# Patient Record
Sex: FEMALE | Race: WHITE | Employment: OTHER | URBAN - NONMETROPOLITAN AREA
[De-identification: names, ages, dates, MRNs, and addresses within clinical notes are randomized per-mention and may not be internally consistent; named-entity substitution may affect disease eponyms.]

---

## 2019-05-20 ENCOUNTER — OFFICE VISIT (OUTPATIENT)
Dept: FAMILY MEDICINE | Facility: CLINIC | Age: 64
End: 2019-05-20
Payer: COMMERCIAL

## 2019-05-20 VITALS
SYSTOLIC BLOOD PRESSURE: 138 MMHG | RESPIRATION RATE: 24 BRPM | DIASTOLIC BLOOD PRESSURE: 80 MMHG | BODY MASS INDEX: 57.37 KG/M2 | WEIGHT: 292.2 LBS | OXYGEN SATURATION: 97 % | TEMPERATURE: 98.6 F | HEIGHT: 60 IN

## 2019-05-20 DIAGNOSIS — J20.9 ACUTE BRONCHITIS WITH SYMPTOMS > 10 DAYS: Primary | ICD-10-CM

## 2019-05-20 PROCEDURE — 99213 OFFICE O/P EST LOW 20 MIN: CPT | Performed by: NURSE PRACTITIONER

## 2019-05-20 RX ORDER — BENZONATATE 200 MG/1
100-200 CAPSULE ORAL 3 TIMES DAILY PRN
Qty: 42 CAPSULE | Refills: 0 | Status: SHIPPED | OUTPATIENT
Start: 2019-05-20 | End: 2019-05-23

## 2019-05-20 RX ORDER — PREDNISONE 20 MG/1
20 TABLET ORAL DAILY
Qty: 5 TABLET | Refills: 0 | Status: SHIPPED | OUTPATIENT
Start: 2019-05-20 | End: 2019-05-23

## 2019-05-20 RX ORDER — AZITHROMYCIN 250 MG/1
TABLET, FILM COATED ORAL
Qty: 6 TABLET | Refills: 0 | Status: SHIPPED | OUTPATIENT
Start: 2019-05-20 | End: 2019-05-23

## 2019-05-20 ASSESSMENT — PAIN SCALES - GENERAL: PAINLEVEL: NO PAIN (0)

## 2019-05-20 ASSESSMENT — MIFFLIN-ST. JEOR: SCORE: 1803.78

## 2019-05-20 NOTE — PROGRESS NOTES
Subjective     Albina López is a 63 year old female who presents to clinic today for the following health issues:    HPI   RESPIRATORY SYMPTOMS      Duration: 2 weeks    Description  nasal congestion, rhinorrhea, cough, wheezing, chills, ear pain and pressure on right, headache, fatigue/malaise, hoarse voice, myalgias and diarrhea    Severity: moderate    Accompanying signs and symptoms: None    History (predisposing factors):  none    Precipitating or alleviating factors: vitamin C, zinc, tylenol for hip pain, robitussin DM    Therapies tried and outcome:  Acetaminophen,  Guaifenesin loosened things up. Made her cough.       No fevers   Shortness of breath with walking - newer this last week   No recent illness exposures       Patient Active Problem List   Diagnosis     Advanced directives, counseling/discussion     CARDIOVASCULAR SCREENING; LDL GOAL LESS THAN 130     Calcium nephrolithiasis     Obesity, morbid (more than 100 lbs over ideal weight or BMI > 40) (H)     Microscopic hematuria     Health Care Home     Past Surgical History:   Procedure Laterality Date     APPENDECTOMY       CHOLECYSTECTOMY       CYSTOSCOPY, RETROGRADES, INSERT STENT URETER(S), COMBINED  4/19/2012    Procedure:COMBINED CYSTOSCOPY, RETROGRADES, INSERT STENT URETER(S); Cystoscopy with Right ureteral stent placement; Surgeon:MARII ESCOBEDO; Location: OR     DILATION AND CURETTAGE, OPERATIVE HYSTEROSCOPY, COMBINED N/A 11/27/2015    Procedure: COMBINED DILATION AND CURETTAGE, OPERATIVE HYSTEROSCOPY;  Surgeon: Rufina Santizo MD;  Location: [facility] OR     ENT SURGERY       LASER HOLMIUM LITHOTRIPSY URETER(S), INSERT STENT, COMBINED  5/4/2012    Procedure:COMBINED CYSTOSCOPY, URETEROSCOPY, LASER HOLMIUM LITHOTRIPSY URETER(S), INSERT STENT; Right ureteral stone extraction,holmium laser, stent placement; Surgeon:JESSA AREVALO; Location:[facility] OR     TONSILLECTOMY         Social History     Tobacco Use     Smoking status: Former  "Smoker     Packs/day: 1.00     Years: 1.00     Pack years: 1.00     Smokeless tobacco: Never Used   Substance Use Topics     Alcohol use: No     Family History   Problem Relation Age of Onset     Heart Disease Father         chf     Obesity Sister      Hypertension Daughter      Diabetes Maternal Grandmother      Cancer Maternal Grandmother         kidney     Diabetes Maternal Grandfather      ROULA Maternal Grandfather          Current Outpatient Medications   Medication Sig Dispense Refill     azithromycin (ZITHROMAX) 250 MG tablet Take 2 tablets (500 mg) by mouth daily for 1 day, THEN 1 tablet (250 mg) daily for 4 days. 6 tablet 0     benzonatate (TESSALON) 200 MG capsule Take 0.5-1 capsules (100-200 mg) by mouth 3 times daily as needed for cough 42 capsule 0     predniSONE (DELTASONE) 20 MG tablet Take 20 mg by mouth daily for 5 days. 5 tablet 0     aspirin 81 MG tablet Take 81 mg by mouth daily.       Allergies   Allergen Reactions     Ciprofloxacin      Dilaudid [Hydromorphone] Other (See Comments)     Decrease in BP     Fluconazole Swelling     Lips swelled and throat closed     Morphine Sulfate Other (See Comments)     Chest heaviness     Nystatin Rash     And burning       Reviewed and updated as needed this visit by Provider  Tobacco  Allergies  Meds  Problems  Med Hx  Surg Hx  Fam Hx  Soc Hx          Review of Systems   ROS COMP: Constitutional, HEENT, cardiovascular, pulmonary, gi and gu systems are negative, except as otherwise noted.      Objective    /80 (BP Location: Right arm, Patient Position: Sitting, Cuff Size: Adult Regular)   Temp 98.6  F (37  C) (Tympanic)   Resp 24   Ht 1.535 m (5' 0.43\")   Wt 132.5 kg (292 lb 3.2 oz)   LMP 09/29/2004   SpO2 97%   BMI 56.25 kg/m    Body mass index is 56.25 kg/m .  Physical Exam   GENERAL APPEARANCE: healthy, alert and no distress  EYES: Eyes grossly normal to inspection, PERRL and conjunctivae and sclerae normal  HENT: ear canals and TM's " "normal and nose and mouth without ulcers or lesions  NECK: no adenopathy, no asymmetry, masses, or scars and thyroid normal to palpation  RESP: lungs clear to auscultation - no rales, rhonchi or wheezes and coarse cough present  CV: regular rates and rhythm, normal S1 S2, no S3 or S4 and no murmur, click or rub  ABDOMEN: soft, nontender, without hepatosplenomegaly or masses and bowel sounds normal  MS: extremities normal- no gross deformities noted    Diagnostic Test Results:  Labs reviewed in Epic        Assessment & Plan     1. Acute bronchitis with symptoms > 10 days  Patient with 2-week history of increased congestion, coarse, productive cough.  Intermittent wheezing.  Denies fevers, however does note some prednisone as well as Tessalon for cough.  Patient advised supportive cares as well and when to return to clinic if symptoms not improving.  - azithromycin (ZITHROMAX) 250 MG tablet; Take 2 tablets (500 mg) by mouth daily for 1 day, THEN 1 tablet (250 mg) daily for 4 days.  Dispense: 6 tablet; Refill: 0  - benzonatate (TESSALON) 200 MG capsule; Take 0.5-1 capsules (100-200 mg) by mouth 3 times daily as needed for cough  Dispense: 42 capsule; Refill: 0  - predniSONE (DELTASONE) 20 MG tablet; Take 20 mg by mouth daily for 5 days.  Dispense: 5 tablet; Refill: 0     BMI:   Estimated body mass index is 56.25 kg/m  as calculated from the following:    Height as of this encounter: 1.535 m (5' 0.43\").    Weight as of this encounter: 132.5 kg (292 lb 3.2 oz).   Weight management plan: Discussed healthy diet and exercise guidelines        Patient Instructions     Start Zithromax for 5 days    5 days of Prednisone     Tessalon for cough 1-2 capsules up to three times daily     Keep head of bed elevated at night and use cool mist vaporizer       Your clinic record indicates that you are due for:   Mammogram, Pap and physical exam and Colonoscopy or yearly stool blood testing (FIT)  Please schedule a physical " exam.      Patient Education     Acute Bronchitis  Your healthcare provider has told you that you have acute bronchitis. Bronchitis is infection or inflammation of the bronchial tubes (airways in the lungs). Normally, air moves easily in and out of the airways. Bronchitis narrows the airways, making it harder for air to flow in and out of the lungs. This causes symptoms such as shortness of breath, coughing up yellow or green mucus, and wheezing. Bronchitis can be acute or chronic. Acute means the condition comes on quickly and goes away in a short time, usually within 3 to 10 days. Chronic means a condition lasts a long time and often comes back.    What causes acute bronchitis?  Acute bronchitis almost always starts as a viral respiratory infection, such as a cold or the flu. Certain factors make it more likely for a cold or flu to turn into bronchitis. These include being very young, being elderly, having a heart or lung problem, or having a weak immune system. Cigarette smoking also makes bronchitis more likely.  When bronchitis develops, the airways become swollen. The airways may also become infected with bacteria. This is known as a secondary infection.  Diagnosing acute bronchitis  Your healthcare provider will examine you and ask about your symptoms and health history. You may also have a sputum culture to test the fluid in your lungs. Chest X-rays may be done to look for infection in the lungs.  Treating acute bronchitis  Bronchitis usually clears up as the cold or flu goes away. You can help feel better faster by doing the following:    Take medicine as directed. You may be told to take ibuprofen or other over-the-counter medicines. These help relieve inflammation in your bronchial tubes. Your healthcare provider may prescribe an inhaler to help open up the bronchial tubes. Most of the time, acute bronchitis is caused by a viral infection. Antibiotics are usually not prescribed for viral  infections.    Drink plenty of fluids, such as water, juice, or warm soup. Fluids loosen mucus so that you can cough it up. This helps you breathe more easily. Fluids also prevent dehydration.    Make sure you get plenty of rest.    Do not smoke. Do not allow anyone else to smoke in your home.  Recovery and follow-up  Follow up with your doctor as you are told. You will likely feel better in a week or two. But a dry cough can linger beyond that time. Let your doctor know if you still have symptoms (other than a dry cough) after 2 weeks, or if you re prone to getting bronchial infections. Take steps to protect yourself from future infections. These steps include stopping smoking and avoiding tobacco smoke, washing your hands often, and getting a yearly flu shot.  When to call your healthcare provider  Call the healthcare provider if you have any of the following:    Fever of 100.4 F (38.0 C) or higher, or as advised    Symptoms that get worse, or new symptoms    Trouble breathing    Symptoms that don t start to improve within a week, or within 3 days of taking antibiotics   Date Last Reviewed: 12/1/2016 2000-2018 [employer]. [address] [place] [address], [place] [address]. All rights reserved. This information is not intended as a substitute for professional medical care. Always follow your healthcare professional's instructions.               Return in about 1 week (around 5/27/2019), or if symptoms worsen or fail to improve.    FABRICE Clayton CNP  [facility]

## 2019-05-20 NOTE — PATIENT INSTRUCTIONS
Start Zithromax for 5 days    5 days of Prednisone     Tessalon for cough 1-2 capsules up to three times daily     Keep head of bed elevated at night and use cool mist vaporizer       Your clinic record indicates that you are due for:   Mammogram, Pap and physical exam and Colonoscopy or yearly stool blood testing (FIT)  Please schedule a physical exam.      Patient Education     Acute Bronchitis  Your healthcare provider has told you that you have acute bronchitis. Bronchitis is infection or inflammation of the bronchial tubes (airways in the lungs). Normally, air moves easily in and out of the airways. Bronchitis narrows the airways, making it harder for air to flow in and out of the lungs. This causes symptoms such as shortness of breath, coughing up yellow or green mucus, and wheezing. Bronchitis can be acute or chronic. Acute means the condition comes on quickly and goes away in a short time, usually within 3 to 10 days. Chronic means a condition lasts a long time and often comes back.    What causes acute bronchitis?  Acute bronchitis almost always starts as a viral respiratory infection, such as a cold or the flu. Certain factors make it more likely for a cold or flu to turn into bronchitis. These include being very young, being elderly, having a heart or lung problem, or having a weak immune system. Cigarette smoking also makes bronchitis more likely.  When bronchitis develops, the airways become swollen. The airways may also become infected with bacteria. This is known as a secondary infection.  Diagnosing acute bronchitis  Your healthcare provider will examine you and ask about your symptoms and health history. You may also have a sputum culture to test the fluid in your lungs. Chest X-rays may be done to look for infection in the lungs.  Treating acute bronchitis  Bronchitis usually clears up as the cold or flu goes away. You can help feel better faster by doing the following:    Take medicine as  directed. You may be told to take ibuprofen or other over-the-counter medicines. These help relieve inflammation in your bronchial tubes. Your healthcare provider may prescribe an inhaler to help open up the bronchial tubes. Most of the time, acute bronchitis is caused by a viral infection. Antibiotics are usually not prescribed for viral infections.    Drink plenty of fluids, such as water, juice, or warm soup. Fluids loosen mucus so that you can cough it up. This helps you breathe more easily. Fluids also prevent dehydration.    Make sure you get plenty of rest.    Do not smoke. Do not allow anyone else to smoke in your home.  Recovery and follow-up  Follow up with your doctor as you are told. You will likely feel better in a week or two. But a dry cough can linger beyond that time. Let your doctor know if you still have symptoms (other than a dry cough) after 2 weeks, or if you re prone to getting bronchial infections. Take steps to protect yourself from future infections. These steps include stopping smoking and avoiding tobacco smoke, washing your hands often, and getting a yearly flu shot.  When to call your healthcare provider  Call the healthcare provider if you have any of the following:    Fever of 100.4 F (38.0 C) or higher, or as advised    Symptoms that get worse, or new symptoms    Trouble breathing    Symptoms that don t start to improve within a week, or within 3 days of taking antibiotics   Date Last Reviewed: 12/1/2016 2000-2018 [employer]. [address] [place] [address], [place] [address]. All rights reserved. This information is not intended as a substitute for professional medical care. Always follow your healthcare professional's instructions.

## 2019-05-23 ENCOUNTER — TELEPHONE (OUTPATIENT)
Dept: FAMILY MEDICINE | Facility: CLINIC | Age: 64
End: 2019-05-23

## 2019-05-23 ENCOUNTER — OFFICE VISIT (OUTPATIENT)
Dept: FAMILY MEDICINE | Facility: CLINIC | Age: 64
End: 2019-05-23
Payer: COMMERCIAL

## 2019-05-23 VITALS
HEIGHT: 60 IN | HEART RATE: 76 BPM | SYSTOLIC BLOOD PRESSURE: 148 MMHG | OXYGEN SATURATION: 96 % | WEIGHT: 292 LBS | BODY MASS INDEX: 57.33 KG/M2 | DIASTOLIC BLOOD PRESSURE: 84 MMHG | RESPIRATION RATE: 20 BRPM | TEMPERATURE: 100.4 F

## 2019-05-23 DIAGNOSIS — R82.90 NONSPECIFIC FINDING ON EXAMINATION OF URINE: Primary | ICD-10-CM

## 2019-05-23 DIAGNOSIS — R30.0 DYSURIA: ICD-10-CM

## 2019-05-23 DIAGNOSIS — N39.0 URINARY TRACT INFECTION WITH HEMATURIA, SITE UNSPECIFIED: ICD-10-CM

## 2019-05-23 DIAGNOSIS — R31.9 URINARY TRACT INFECTION WITH HEMATURIA, SITE UNSPECIFIED: ICD-10-CM

## 2019-05-23 LAB
ALBUMIN UR-MCNC: >=300 MG/DL
APPEARANCE UR: ABNORMAL
BACTERIA #/AREA URNS HPF: ABNORMAL /HPF
BILIRUB UR QL STRIP: NEGATIVE
COLOR UR AUTO: ABNORMAL
GLUCOSE UR STRIP-MCNC: NEGATIVE MG/DL
HGB UR QL STRIP: ABNORMAL
KETONES UR STRIP-MCNC: NEGATIVE MG/DL
LEUKOCYTE ESTERASE UR QL STRIP: ABNORMAL
NITRATE UR QL: POSITIVE
NON-SQ EPI CELLS #/AREA URNS LPF: ABNORMAL /LPF
PH UR STRIP: 5.5 PH (ref 5–7)
RBC #/AREA URNS AUTO: ABNORMAL /HPF
SOURCE: ABNORMAL
SP GR UR STRIP: 1.02 (ref 1–1.03)
UROBILINOGEN UR STRIP-ACNC: 0.2 EU/DL (ref 0.2–1)
WBC #/AREA URNS AUTO: ABNORMAL /HPF

## 2019-05-23 PROCEDURE — 87186 SC STD MICRODIL/AGAR DIL: CPT | Performed by: NURSE PRACTITIONER

## 2019-05-23 PROCEDURE — 87088 URINE BACTERIA CULTURE: CPT | Performed by: NURSE PRACTITIONER

## 2019-05-23 PROCEDURE — 99213 OFFICE O/P EST LOW 20 MIN: CPT | Performed by: NURSE PRACTITIONER

## 2019-05-23 PROCEDURE — 87086 URINE CULTURE/COLONY COUNT: CPT | Performed by: NURSE PRACTITIONER

## 2019-05-23 PROCEDURE — 81001 URINALYSIS AUTO W/SCOPE: CPT | Performed by: NURSE PRACTITIONER

## 2019-05-23 RX ORDER — NITROFURANTOIN 25; 75 MG/1; MG/1
100 CAPSULE ORAL 2 TIMES DAILY
Qty: 14 CAPSULE | Refills: 0 | Status: SHIPPED | OUTPATIENT
Start: 2019-05-23 | End: 2019-07-03

## 2019-05-23 ASSESSMENT — MIFFLIN-ST. JEOR: SCORE: 1796

## 2019-05-23 NOTE — TELEPHONE ENCOUNTER
05-20-19 OV reviewed. States taking abx, tessalon as prescribed. Took one dose only prednisone 20 mg 9 AM and stopped due to s/e insomnia- states was awake 24 hrs. Feels resp sx have improved some, not as tight in chest.   Also reports had bloody nose this week x1. Experiencing dysuria, pressure, increased incontinence since above visit. Describes sx as possibly UTI.  Denies fevers, chills, LBP, flank pain.  Advised need to be seen- scheduled with Holly this afternoon.  BOAZ Hoyt RN

## 2019-05-23 NOTE — PATIENT INSTRUCTIONS
1. Dysuria  Acute, stable  - *UA reflex to Microscopic and Culture ([place] [facility] (except [place] and [place])  - Urine Microscopic    2. Nonspecific finding on examination of urine  Acute, stable  - Urine Culture Aerobic Bacterial    3. Urinary tract infection with hematuria, site unspecified  Acute, uncontrolled  - nitroFURantoin macrocrystal-monohydrate (MACROBID) 100 MG capsule; Take 1 capsule (100 mg) by mouth 2 times daily  Dispense: 14 capsule; Refill: 0  Drop off UA after antibiotic is complete        Patient Education     Understanding Urinary Tract Infections (UTIs)  Most UTIs are caused by bacteria, although they may also be caused by viruses or fungi. Bacteria from the bowel are the most common source of infection. The infection may start because of any of the following:    Sexual activity. During sex, bacteria can travel from the penis, vagina, or rectum into the urethra.     Bacteria on the skin outside the rectum may travel into the urethra. This is more common in women since the rectum and urethra are closer to each other than in men. Wiping from front to back after using the toilet and keeping the area clean can help prevent germs from getting to the urethra.    Blockage of urine flow through the urinary tract. If urine sits too long, germs may start to grow out of control.      Parts of the urinary tract  The infection can occur in any part of the urinary tract.    The kidneys collect and store urine.    The ureters carry urine from the kidneys to the bladder.    The bladder holds urine until you are ready to let it out.    The urethra carries urine from the bladder out of the body. It is shorter in women, so bacteria can move through it more easily. The urethra is longer in men, so a UTI is less likely to reach the bladder or kidneys in men.  Date Last Reviewed: 1/1/2017 2000-2018 [employer]. [address] [place] [address], [place] [address]. All rights reserved. This  information is not intended as a substitute for professional medical care. Always follow your healthcare professional's instructions.

## 2019-05-23 NOTE — PROGRESS NOTES
SUBJECTIVE   Albina López is a  female who presents to clinic today for the following health issue(s):       PAP 9/2015 NIL  Mammo 8/2012 OVERDUE    URINARY TRACT SYMPTOMS      Duration: This morning     Description   Experiencing dysuria, pressure, increased incontinence since above visit.  Describes sx as possibly UTI. Denies fevers, chills, LBP, flank pain.  dysuria, frequency and hematuria    Intensity:  Severe when urinating     Accompanying signs and symptoms:  Fever/chills: YES  Flank pain YES Rt sided   Nausea and vomiting: YES- nausea  Vaginal symptoms: none  Abdominal/Pelvic Pain: YES- cramping     History  History of frequent UTI's: no   History of kidney stones: YES 2012  Sexually Active: no   Possibility of pregnancy: No    Precipitating or alleviating factors: None    Therapies tried and outcome: increase fluid intake   Outcome: No relief     Urology DR. Cobian  Last seen by Urology in 12/2014  History uric acid kidney stones  No recent UTIs since 2014    PCP   Physician No Ref-Primary None    Health Maintenance        Health Maintenance Due   Topic Date Due     HEPATITIS C SCREENING  1955     ADVANCED DIRECTIVE PLANNING  1955     MAMMO SCREENING  1955     PAP  1955     FIT  05/30/1964     HIV SCREENING  05/30/1969     HPV  05/30/1984     LIPID  05/30/1999     ZOSTER IMMUNIZATION (1 of 2) 05/30/2004     PHQ-2  01/01/2019     MEDICARE ANNUAL WELLNESS VISIT  05/30/2019       HPI        Patient Active Problem List   Diagnosis     Advanced directives, counseling/discussion     CARDIOVASCULAR SCREENING; LDL GOAL LESS THAN 130     Calcium nephrolithiasis     Obesity, morbid (more than 100 lbs over ideal weight or BMI > 40) (H)     Microscopic hematuria     Health Care Home     No current outpatient medications on file.     No current facility-administered medications for this visit.        Reviewed and updated as needed this visit by Provider:  Tobacco  Allergies  Meds  Med  "Hx  Surg Hx  Fam Hx  Soc Hx     ROS:  Constitutional, HEENT, cardiovascular, pulmonary, gi and gu systems are negative, except as otherwise noted.    PHYSICAL EXAM   /84 (BP Location: Right arm, Patient Position: Sitting, Cuff Size: Adult Large)   Pulse 76   Temp 100.4  F (38  C) (Tympanic)   Resp 20   Ht 1.524 m (5')   Wt 132.5 kg (292 lb)   LMP 09/29/2004   SpO2 96%   BMI 57.03 kg/m    Body mass index is 57.03 kg/m .  GENERAL APPEARANCE: healthy, alert, no distress and morbidly obese  NECK: no adenopathy, no asymmetry, masses, or scars and thyroid normal to palpation  RESP: lungs clear to auscultation - no rales, rhonchi or wheezes and non-productive cough  CV: regular rates and rhythm, normal S1 S2, no S3 or S4 and no murmur, click or rub  ABDOMEN: soft, nontender, without hepatosplenomegaly or masses, bowel sounds normal and obese, Right CVA tenderness  MS: extremities normal- no gross deformities noted- she is getting up slowly \"back is out with coughing\"  PSYCH: mentation appears normal and affect normal/bright      Results for orders placed or performed in visit on 05/23/19   *UA reflex to Microscopic and Culture ([facility] (except [place] and [place])   Result Value Ref Range    Color Urine Brown     Appearance Urine Cloudy     Glucose Urine Negative NEG^Negative mg/dL    Bilirubin Urine Negative NEG^Negative    Ketones Urine Negative NEG^Negative mg/dL    Specific Gravity Urine 1.020 1.003 - 1.035    Blood Urine Large (A) NEG^Negative    pH Urine 5.5 5.0 - 7.0 pH    Protein Albumin Urine >=300 (A) NEG^Negative mg/dL    Urobilinogen Urine 0.2 0.2 - 1.0 EU/dL    Nitrite Urine Positive (A) NEG^Negative    Leukocyte Esterase Urine Small (A) NEG^Negative    Source Midstream Urine    Urine Microscopic   Result Value Ref Range    WBC Urine  (A) OTO5^0 - 5 /HPF    RBC Urine 5-10 (A) OTO2^O - 2 /HPF    Squamous Epithelial /LPF Urine Few FEW^Few /LPF    Bacteria Urine Many " (A) NEG^Negative /HPF       ASSESSMENT & PLAN   1. Dysuria  Acute, stable  - *UA reflex to Microscopic and Culture ([place] [facility] (except [place] and [place])  - Urine Microscopic  Allergies to Cipro    2. Nonspecific finding on examination of urine  Acute, stable  - Urine Culture Aerobic Bacterial    3. Urinary tract infection with hematuria, site unspecified  Acute, uncontrolled  - nitroFURantoin macrocrystal-monohydrate (MACROBID) 100 MG capsule; Take 1 capsule (100 mg) by mouth 2 times daily  Dispense: 14 capsule; Refill: 0  Drop off UA after antibiotic is complete        Patient Education     Understanding Urinary Tract Infections (UTIs)  Most UTIs are caused by bacteria, although they may also be caused by viruses or fungi. Bacteria from the bowel are the most common source of infection. The infection may start because of any of the following:    Sexual activity. During sex, bacteria can travel from the penis, vagina, or rectum into the urethra.     Bacteria on the skin outside the rectum may travel into the urethra. This is more common in women since the rectum and urethra are closer to each other than in men. Wiping from front to back after using the toilet and keeping the area clean can help prevent germs from getting to the urethra.    Blockage of urine flow through the urinary tract. If urine sits too long, germs may start to grow out of control.      Parts of the urinary tract  The infection can occur in any part of the urinary tract.    The kidneys collect and store urine.    The ureters carry urine from the kidneys to the bladder.    The bladder holds urine until you are ready to let it out.    The urethra carries urine from the bladder out of the body. It is shorter in women, so bacteria can move through it more easily. The urethra is longer in men, so a UTI is less likely to reach the bladder or kidneys in men.  Date Last Reviewed: 1/1/2017 2000-2018 [employer] [address]  [place] [address]. All rights reserved. This information is not intended as a substitute for professional medical care. Always follow your healthcare professional's instructions.             Risks, benefits, side effects and rationale for treatment plan fully discussed with the patient and understanding expressed.    TINO Vasquez-[facility]

## 2019-05-23 NOTE — TELEPHONE ENCOUNTER
Patient was seen the other day for bronchitis and she was given an antibiotic and prednisone. She states the Prednisone kept her up for 24 hours. She is also having a nose bleed and having some bladder issues now. Doesn't know if that is related to the medication she is on or not. Please advise.    Lorrie [facility]

## 2019-05-23 NOTE — NURSING NOTE
Chief Complaint   Patient presents with     Urinary Problem       Initial /84 (BP Location: Right arm, Patient Position: Sitting, Cuff Size: Adult Large)   Pulse 76   Temp 100.4  F (38  C) (Tympanic)   Resp 20   Ht 1.524 m (5')   Wt 132.5 kg (292 lb)   LMP 09/29/2004   SpO2 96%   BMI 57.03 kg/m   Estimated body mass index is 57.03 kg/m  as calculated from the following:    Height as of this encounter: 1.524 m (5').    Weight as of this encounter: 132.5 kg (292 lb).    Patient presents to the clinic using No DME    Health Maintenance that is potentially due pending provider review:  NONE    n/a    Is there anyone who you would like to be able to receive your results? No  If yes have patient fill out [facility]ACE

## 2019-05-24 LAB
BACTERIA SPEC CULT: ABNORMAL
Lab: ABNORMAL
SPECIMEN SOURCE: ABNORMAL

## 2019-05-26 NOTE — RESULT ENCOUNTER NOTE
Ecoli found in urine on culture. Nitrofurantoin is sensitive. Complete antibiotic course.  Please call her with these results. Send a hard copy for their records.   Thanks. TINO Collins

## 2019-05-30 DIAGNOSIS — N39.0 URINARY TRACT INFECTION WITH HEMATURIA, SITE UNSPECIFIED: ICD-10-CM

## 2019-05-30 DIAGNOSIS — R31.9 URINARY TRACT INFECTION WITH HEMATURIA, SITE UNSPECIFIED: ICD-10-CM

## 2019-05-30 LAB
ALBUMIN UR-MCNC: ABNORMAL MG/DL
APPEARANCE UR: CLEAR
BACTERIA #/AREA URNS HPF: ABNORMAL /HPF
BILIRUB UR QL STRIP: NEGATIVE
COLOR UR AUTO: YELLOW
GLUCOSE UR STRIP-MCNC: NEGATIVE MG/DL
HGB UR QL STRIP: ABNORMAL
KETONES UR STRIP-MCNC: ABNORMAL MG/DL
LEUKOCYTE ESTERASE UR QL STRIP: NEGATIVE
NITRATE UR QL: NEGATIVE
NON-SQ EPI CELLS #/AREA URNS LPF: ABNORMAL /LPF
PH UR STRIP: 5.5 PH (ref 5–7)
RBC #/AREA URNS AUTO: ABNORMAL /HPF
SOURCE: ABNORMAL
SP GR UR STRIP: 1.02 (ref 1–1.03)
UROBILINOGEN UR STRIP-ACNC: 0.2 EU/DL (ref 0.2–1)
WBC #/AREA URNS AUTO: ABNORMAL /HPF

## 2019-05-30 PROCEDURE — 81001 URINALYSIS AUTO W/SCOPE: CPT | Performed by: NURSE PRACTITIONER

## 2019-05-31 ENCOUNTER — TELEPHONE (OUTPATIENT)
Dept: FAMILY MEDICINE | Facility: CLINIC | Age: 64
End: 2019-05-31

## 2019-05-31 NOTE — RESULT ENCOUNTER NOTE
UTI is improving on Nitrofurantoin.  Push fluids.   Please call her with these results. Send a hard copy for their records.   Thanks. TINO Collins

## 2019-05-31 NOTE — TELEPHONE ENCOUNTER
Panel Management Review      Patient has the following on her problem list: None      Composite cancer screening  Chart review shows that this patient is due/due soon for the following Pap Smear, Mammogram and Colonoscopy  Summary:    Patient is due/failing the following:   COLONOSCOPY, MAMMOGRAM, PAP and PHYSICAL    Action needed:   Patient needs office visit for physical. and Patient needs referral/order: mammo and colon screening    Type of outreach:    Phone, left message for patient to call back.     Questions for provider review:    None                                                                                                                                    Priti Castellanos MA

## 2019-05-31 NOTE — TELEPHONE ENCOUNTER
Tina called back message given to make an appointment will call back in near future to set this up with A [place]    Junie Man CSS

## 2019-07-03 ENCOUNTER — APPOINTMENT (OUTPATIENT)
Dept: CT IMAGING | Facility: CLINIC | Age: 64
End: 2019-07-03
Attending: EMERGENCY MEDICINE
Payer: MEDICARE

## 2019-07-03 ENCOUNTER — HOSPITAL ENCOUNTER (EMERGENCY)
Facility: CLINIC | Age: 64
Discharge: HOME | End: 2019-07-03
Attending: EMERGENCY MEDICINE | Admitting: EMERGENCY MEDICINE
Payer: MEDICARE

## 2019-07-03 VITALS
BODY MASS INDEX: 56.64 KG/M2 | OXYGEN SATURATION: 95 % | SYSTOLIC BLOOD PRESSURE: 145 MMHG | WEIGHT: 290 LBS | TEMPERATURE: 98.3 F | RESPIRATION RATE: 20 BRPM | HEART RATE: 78 BPM | DIASTOLIC BLOOD PRESSURE: 88 MMHG

## 2019-07-03 DIAGNOSIS — N23 RENAL COLIC: ICD-10-CM

## 2019-07-03 LAB
ALBUMIN UR-MCNC: NEGATIVE MG/DL
ANION GAP SERPL CALCULATED.3IONS-SCNC: 4 MMOL/L (ref 3–14)
APPEARANCE UR: ABNORMAL
BASOPHILS # BLD AUTO: 0.1 10E9/L (ref 0–0.2)
BASOPHILS NFR BLD AUTO: 0.6 %
BILIRUB UR QL STRIP: NEGATIVE
BUN SERPL-MCNC: 13 MG/DL (ref 7–30)
CALCIUM SERPL-MCNC: 8.9 MG/DL (ref 8.5–10.1)
CHLORIDE SERPL-SCNC: 111 MMOL/L (ref 94–109)
CO2 SERPL-SCNC: 27 MMOL/L (ref 20–32)
COLOR UR AUTO: YELLOW
CREAT SERPL-MCNC: 0.74 MG/DL (ref 0.52–1.04)
DIFFERENTIAL METHOD BLD: NORMAL
EOSINOPHIL # BLD AUTO: 0.1 10E9/L (ref 0–0.7)
EOSINOPHIL NFR BLD AUTO: 1.8 %
ERYTHROCYTE [DISTWIDTH] IN BLOOD BY AUTOMATED COUNT: 13.3 % (ref 10–15)
GFR SERPL CREATININE-BSD FRML MDRD: 86 ML/MIN/{1.73_M2}
GLUCOSE SERPL-MCNC: 88 MG/DL (ref 70–99)
GLUCOSE UR STRIP-MCNC: NEGATIVE MG/DL
HCT VFR BLD AUTO: 44.8 % (ref 35–47)
HGB BLD-MCNC: 14.3 G/DL (ref 11.7–15.7)
HGB UR QL STRIP: ABNORMAL
HYALINE CASTS #/AREA URNS LPF: 1 /LPF (ref 0–2)
IMM GRANULOCYTES # BLD: 0 10E9/L (ref 0–0.4)
IMM GRANULOCYTES NFR BLD: 0.4 %
KETONES UR STRIP-MCNC: NEGATIVE MG/DL
LEUKOCYTE ESTERASE UR QL STRIP: NEGATIVE
LYMPHOCYTES # BLD AUTO: 1.4 10E9/L (ref 0.8–5.3)
LYMPHOCYTES NFR BLD AUTO: 17.6 %
MCH RBC QN AUTO: 29.1 PG (ref 26.5–33)
MCHC RBC AUTO-ENTMCNC: 31.9 G/DL (ref 31.5–36.5)
MCV RBC AUTO: 91 FL (ref 78–100)
MONOCYTES # BLD AUTO: 0.5 10E9/L (ref 0–1.3)
MONOCYTES NFR BLD AUTO: 6.9 %
MUCOUS THREADS #/AREA URNS LPF: PRESENT /LPF
NEUTROPHILS # BLD AUTO: 5.6 10E9/L (ref 1.6–8.3)
NEUTROPHILS NFR BLD AUTO: 72.7 %
NITRATE UR QL: NEGATIVE
NRBC # BLD AUTO: 0 10*3/UL
NRBC BLD AUTO-RTO: 0 /100
PH UR STRIP: 6 PH (ref 5–7)
PLATELET # BLD AUTO: 213 10E9/L (ref 150–450)
POTASSIUM SERPL-SCNC: 4.3 MMOL/L (ref 3.4–5.3)
RBC # BLD AUTO: 4.92 10E12/L (ref 3.8–5.2)
RBC #/AREA URNS AUTO: 83 /HPF (ref 0–2)
SODIUM SERPL-SCNC: 142 MMOL/L (ref 133–144)
SOURCE: ABNORMAL
SP GR UR STRIP: 1.01 (ref 1–1.03)
SQUAMOUS #/AREA URNS AUTO: 3 /HPF (ref 0–1)
UROBILINOGEN UR STRIP-MCNC: 0 MG/DL (ref 0–2)
WBC # BLD AUTO: 7.8 10E9/L (ref 4–11)
WBC #/AREA URNS AUTO: 1 /HPF (ref 0–5)

## 2019-07-03 PROCEDURE — 96375 TX/PRO/DX INJ NEW DRUG ADDON: CPT | Performed by: EMERGENCY MEDICINE

## 2019-07-03 PROCEDURE — 96374 THER/PROPH/DIAG INJ IV PUSH: CPT | Performed by: EMERGENCY MEDICINE

## 2019-07-03 PROCEDURE — 99285 EMERGENCY DEPT VISIT HI MDM: CPT | Mod: Z6 | Performed by: EMERGENCY MEDICINE

## 2019-07-03 PROCEDURE — 85025 COMPLETE CBC W/AUTO DIFF WBC: CPT | Performed by: EMERGENCY MEDICINE

## 2019-07-03 PROCEDURE — 80048 BASIC METABOLIC PNL TOTAL CA: CPT | Performed by: EMERGENCY MEDICINE

## 2019-07-03 PROCEDURE — 96361 HYDRATE IV INFUSION ADD-ON: CPT | Performed by: EMERGENCY MEDICINE

## 2019-07-03 PROCEDURE — 81001 URINALYSIS AUTO W/SCOPE: CPT | Performed by: EMERGENCY MEDICINE

## 2019-07-03 PROCEDURE — 25000128 H RX IP 250 OP 636: Performed by: EMERGENCY MEDICINE

## 2019-07-03 PROCEDURE — 74176 CT ABD & PELVIS W/O CONTRAST: CPT

## 2019-07-03 PROCEDURE — 99285 EMERGENCY DEPT VISIT HI MDM: CPT | Mod: 25 | Performed by: EMERGENCY MEDICINE

## 2019-07-03 RX ORDER — KETOROLAC TROMETHAMINE 30 MG/ML
30 INJECTION, SOLUTION INTRAMUSCULAR; INTRAVENOUS ONCE
Status: COMPLETED | OUTPATIENT
Start: 2019-07-03 | End: 2019-07-03

## 2019-07-03 RX ORDER — KETOROLAC TROMETHAMINE 15 MG/ML
15 INJECTION, SOLUTION INTRAMUSCULAR; INTRAVENOUS ONCE
Status: DISCONTINUED | OUTPATIENT
Start: 2019-07-03 | End: 2019-07-03

## 2019-07-03 RX ORDER — OXYCODONE AND ACETAMINOPHEN 5; 325 MG/1; MG/1
1-2 TABLET ORAL EVERY 4 HOURS PRN
Qty: 8 TABLET | Refills: 0 | Status: SHIPPED | OUTPATIENT
Start: 2019-07-03 | End: 2019-08-07

## 2019-07-03 RX ORDER — SODIUM CHLORIDE 9 MG/ML
1000 INJECTION, SOLUTION INTRAVENOUS CONTINUOUS
Status: DISCONTINUED | OUTPATIENT
Start: 2019-07-03 | End: 2019-07-03 | Stop reason: HOSPADM

## 2019-07-03 RX ORDER — FENTANYL CITRATE 50 UG/ML
75 INJECTION, SOLUTION INTRAMUSCULAR; INTRAVENOUS ONCE
Status: COMPLETED | OUTPATIENT
Start: 2019-07-03 | End: 2019-07-03

## 2019-07-03 RX ORDER — ONDANSETRON 2 MG/ML
4 INJECTION INTRAMUSCULAR; INTRAVENOUS ONCE
Status: COMPLETED | OUTPATIENT
Start: 2019-07-03 | End: 2019-07-03

## 2019-07-03 RX ORDER — TAMSULOSIN HYDROCHLORIDE 0.4 MG/1
0.4 CAPSULE ORAL DAILY
Qty: 7 CAPSULE | Refills: 0 | Status: SHIPPED | OUTPATIENT
Start: 2019-07-03 | End: 2019-08-07

## 2019-07-03 RX ORDER — ONDANSETRON 4 MG/1
8 TABLET, ORALLY DISINTEGRATING ORAL EVERY 8 HOURS PRN
Qty: 10 TABLET | Refills: 0 | Status: SHIPPED | OUTPATIENT
Start: 2019-07-03 | End: 2019-08-07

## 2019-07-03 RX ORDER — FENTANYL CITRATE 50 UG/ML
75 INJECTION, SOLUTION INTRAMUSCULAR; INTRAVENOUS ONCE
Status: DISCONTINUED | OUTPATIENT
Start: 2019-07-03 | End: 2019-07-03 | Stop reason: HOSPADM

## 2019-07-03 RX ADMIN — SODIUM CHLORIDE 1000 ML: 9 INJECTION, SOLUTION INTRAVENOUS at 08:05

## 2019-07-03 RX ADMIN — ONDANSETRON 4 MG: 2 INJECTION INTRAMUSCULAR; INTRAVENOUS at 08:05

## 2019-07-03 RX ADMIN — KETOROLAC TROMETHAMINE 30 MG: 30 INJECTION, SOLUTION INTRAMUSCULAR at 09:34

## 2019-07-03 RX ADMIN — FENTANYL CITRATE 75 MCG: 50 INJECTION, SOLUTION INTRAMUSCULAR; INTRAVENOUS at 08:08

## 2019-07-03 NOTE — ED PROVIDER NOTES
History     Chief Complaint   Patient presents with     Flank Pain     right flank, history of stones     HPI  Albina López is a 64 year old female with a history previous kidney stones who presents emergency department with right flank pain.  Patient states last night she began having flank pain that is now radiated down to the right lower quadrant and groin.  Is a constant pain that she rates as 6 out of 10.  She cannot sit still.  Had difficulty sleeping last night.  She noticed some blood in her urine.  She denies any fevers or chills she has not had any visual changes.  She denies headache.  She denies chest pain shortness of breath back pain or focal numbness weakness any extremity.  She has not had a rash.    Allergies:  Allergies   Allergen Reactions     Ciprofloxacin      Dilaudid [Hydromorphone] Other (See Comments)     Decrease in BP     Fluconazole Swelling     Lips swelled and throat closed     Morphine Sulfate Other (See Comments)     Chest heaviness     Nystatin Rash     And burning       Problem List:    Patient Active Problem List    Diagnosis Date Noted     Health Care Home 01/31/2013     Priority: Medium     EMERGENCY CARE PLAN  January 28, 2013: No current Care Coordination follow up planned. Please refer if Care Coordination services are needed.    Presenting Problem Signs and Symptoms Treatment Plan   Questions or concerns   during clinic hours   I will call my clinic directly:   [facility]  [phone]   Questions or concerns outside clinic hours   I will call the 24 hour nurse line at   [phone] or [phone].   Need to schedule an appointment   I will call the 24 hour scheduling team at [phone] or my clinic directly at [phone].   Same day treatment     I will call my clinic first, nurse line if after hours, urgent care and express care if needed.   Clinic Care Coordinators (RN/Social Work):   RICH Bass SW      I will call a clinic  "care coordinator directly:     Stacia RN  [phone]    Shagufta SW:    [phone]    Or call my clinic at [phone] and ask to speak with care coordination.   Crisis Services: Behavioral or Mental Health Thoughts of harming self or others. Crisis Connection 24 Hour Phone Line  [phone]    [facility] 24 Hour Crisis Services  [phone]    [facility] (Behavioral Health Providers) Network [phone]    [facility]   [phone]     Emergency treatment -- Immediately    CAll 911         DX V65.8 REPLACED WITH [id number] HEALTH CARE HOME (04/08/2013)       Microscopic hematuria 07/24/2012     Priority: Medium     History of blood in urine from kidney stones, has been fairly chronic       Advanced directives, counseling/discussion 04/16/2012     Priority: Medium     Patient does not have an Advance/Health Care Directive (HCD), given \"What is Advance Care Planning?\" flyer.    Farzaneh Escobedo  April 16, 2012         CARDIOVASCULAR SCREENING; LDL GOAL LESS THAN 130 04/16/2012     Priority: Medium     Calcium nephrolithiasis 04/16/2012     Priority: Medium     Has needed stent in the past  Septic at presentation.           Obesity, morbid (more than 100 lbs over ideal weight or BMI > 40) (H) 04/16/2012     Priority: Medium        Past Medical History:    No past medical history on file.    Past Surgical History:    Past Surgical History:   Procedure Laterality Date     APPENDECTOMY       CHOLECYSTECTOMY       CYSTOSCOPY, RETROGRADES, INSERT STENT URETER(S), COMBINED  4/19/2012    Procedure:COMBINED CYSTOSCOPY, RETROGRADES, INSERT STENT URETER(S); Cystoscopy with Right ureteral stent placement; Surgeon:MARII ESCOBEDO; [facility]     DILATION AND CURETTAGE, OPERATIVE HYSTEROSCOPY, COMBINED N/A 11/27/2015    Procedure: COMBINED DILATION AND CURETTAGE, OPERATIVE HYSTEROSCOPY;  Surgeon: Rufina Santizo MD;  Location: [facility] OR     ENT SURGERY       LASER HOLMIUM LITHOTRIPSY URETER(S), " INSERT STENT, COMBINED  5/4/2012    Procedure:COMBINED CYSTOSCOPY, URETEROSCOPY, LASER HOLMIUM LITHOTRIPSY URETER(S), INSERT STENT; Right ureteral stone extraction,holmium laser, stent placement; Surgeon:JESSA AREVALO; Location:[facility] OR     TONSILLECTOMY         Family History:    Family History   Problem Relation Age of Onset     Heart Disease Father         chf     Obesity Sister      Hypertension Daughter      Diabetes Maternal Grandmother      Cancer Maternal Grandmother         kidney     Diabetes Maternal Grandfather      C.A.D. Maternal Grandfather        Social History:  Marital Status:   [2]  Social History     Tobacco Use     Smoking status: Former Smoker     Packs/day: 1.00     Years: 1.00     Pack years: 1.00     Smokeless tobacco: Never Used   Substance Use Topics     Alcohol use: No     Drug use: No        Medications:      nitroFURantoin macrocrystal-monohydrate (MACROBID) 100 MG capsule         Review of Systems  All systems reviewed and other than pertinent positives negatives in HPI all other systems are negative.  Physical Exam   BP: (!) 171/105  Pulse: 73  Temp: 98.3  F (36.8  C)  Resp: 20  Weight: 131.5 kg (290 lb)  SpO2: 96 %      Physical Exam    ED Course        Procedures               Critical Care time:  none               No results found for this or any previous visit (from the past 24 hour(s)).    Medications   0.9% sodium chloride BOLUS (0 mLs Intravenous Stopped 7/3/19 1140)   ondansetron (ZOFRAN) injection 4 mg (4 mg Intravenous Given 7/3/19 0805)   fentaNYL (PF) (SUBLIMAZE) injection 75 mcg (75 mcg Intravenous Given 7/3/19 0808)   ketorolac (TORADOL) injection 30 mg (30 mg Intravenous Given 7/3/19 0934)     Results for orders placed or performed during the hospital encounter of 07/03/19   Abd/pelvis CT - no contrast - Stone Protocol    Narrative    CT ABDOMEN AND PELVIS WITHOUT CONTRAST 7/3/2019 8:24 AM     HISTORY: Right flank pain. History of kidney stone.    Radiation dose  for this scan was reduced using automated exposure  control, adjustment of the mA and/or kV according to patient size, or  iterative reconstruction technique.    FINDINGS: Marked hepatic fatty infiltration is noted. Splenic  calcified granulomas are noted. Right ureteral 0.3 cm stone is noted  at the approximate level of the S1 neural foramen. This is associated  with dilatation of the right ureter and mild to moderate right  hydronephrosis. No left hydronephrosis. No additional renal stones are  identified. There is a 4.7 x 3.6 cm pedunculated uterine left-sided  fibroid. Pelvic contents are otherwise grossly unremarkable for the  unenhanced technique.      Impression    IMPRESSION:  1. Right ureteral mid to distal obstructing 0.3 cm stone with  associated right hydronephrosis and ureteral dilatation.  2. Uterine left-sided pedunculated 4.7 cm fibroid.  3. Marked hepatic fatty infiltration--possible etiologies include  consumption of alcohol or excessive carbohydrate intake, especially  sugar/fructose. Metabolic syndrome commonly occurs in combination with  nonalcoholic fatty liver disease. Although often reversible,  nonalcoholic fatty liver disease can progress to steatohepatitis and  cirrhosis.    LISETH ABRAHAM MD     Assessments & Plan (with Medical Decision Making) records were reviewed.  Labs were obtained.  Patient was given fentanyl IV for pain medication.  Patient was given IV fluids.  Basic metabolic panel was without significant abnormality.  White count was not elevated hemoglobin 14.3.  Urine analysis with large blood 83 RBCs no evidence of infection.  Patient still having some pain but was drowsy.  She was given Toradol and put on oxygen due to desats secondary to being sleepy.  CT scan revealed the right ureter mid to distal obstructing 3 mm stone with associated right hydronephrosis and ureteral dilation.  There was a left-sided pedunculated 4.7 cm fibroid.  There was also marked hepatic fatty  infiltration.  Pain much improved with medications.  She is alert and was oxygenating well off oxygen.  She ambulated around the room with sats at 93%.  I feel her decreased sats were secondary to medication.  I am going to treat her with pain medication nausea medication and Flomax.  She should return if any fevers decreased urine output worsening pain or other symptoms develop.  She is agree with this plan.     I have reviewed the nursing notes.    I have reviewed the findings, diagnosis, plan and need for follow up with the patient.          Medication List      Started    ondansetron 4 MG ODT tab  Commonly known as:  ZOFRAN ODT  8 mg, Oral, EVERY 8 HOURS PRN     oxyCODONE-acetaminophen 5-325 MG tablet  Commonly known as:  PERCOCET  1-2 tablets, Oral, EVERY 4 HOURS PRN     tamsulosin 0.4 MG capsule  Commonly known as:  FLOMAX  0.4 mg, Oral, DAILY            Final diagnoses:   Renal colic       7/3/2019   [facility] EMERGENCY DEPARTMENT     Haja Shafer MD  07/05/19 [id number]

## 2019-07-03 NOTE — DISCHARGE INSTRUCTIONS
Return if symptoms worsen or new symptoms develop.  Follow-up with primary care physician later this week or early next week.  Follow-up with urology as needed.  If increased pain fever decreased urine output or other symptoms present please return for further evaluation and care.

## 2019-07-03 NOTE — ED AVS SNAPSHOT
[facility] Emergency Department  [address] [address]  Phone:  [phone]  Fax:  [phone]                                    Albina López   MRN: [id number]    Department:  [facility] Emergency Department   Date of Visit:  7/3/2019           After Visit Summary Signature Page    I have received my discharge instructions, and my questions have been answered. I have discussed any challenges I see with this plan with the nurse or doctor.    ..........................................................................................................................................  Patient/Patient Representative Signature      ..........................................................................................................................................  Patient Representative Print Name and Relationship to Patient    ..................................................               ................................................  Date                                   Time    ..........................................................................................................................................  Reviewed by Signature/Title    ...................................................              ..............................................  Date                                               Time          22EPIC Rev 08/18

## 2019-08-07 ENCOUNTER — OFFICE VISIT (OUTPATIENT)
Dept: FAMILY MEDICINE | Facility: CLINIC | Age: 64
End: 2019-08-07
Payer: COMMERCIAL

## 2019-08-07 VITALS
DIASTOLIC BLOOD PRESSURE: 70 MMHG | HEART RATE: 81 BPM | TEMPERATURE: 98 F | OXYGEN SATURATION: 94 % | RESPIRATION RATE: 18 BRPM | SYSTOLIC BLOOD PRESSURE: 136 MMHG | HEIGHT: 60 IN | WEIGHT: 293 LBS | BODY MASS INDEX: 57.52 KG/M2

## 2019-08-07 DIAGNOSIS — Z00.00 ENCOUNTER FOR MEDICARE ANNUAL WELLNESS EXAM: Primary | ICD-10-CM

## 2019-08-07 DIAGNOSIS — Z13.220 SCREENING FOR HYPERLIPIDEMIA: ICD-10-CM

## 2019-08-07 DIAGNOSIS — R10.2 PELVIC PAIN IN FEMALE: ICD-10-CM

## 2019-08-07 DIAGNOSIS — Z12.11 SPECIAL SCREENING FOR MALIGNANT NEOPLASMS, COLON: ICD-10-CM

## 2019-08-07 DIAGNOSIS — Z11.59 NEED FOR HEPATITIS C SCREENING TEST: ICD-10-CM

## 2019-08-07 DIAGNOSIS — Z12.39 BREAST CANCER SCREENING: ICD-10-CM

## 2019-08-07 DIAGNOSIS — Z12.4 PAP SMEAR FOR CERVICAL CANCER SCREENING: ICD-10-CM

## 2019-08-07 DIAGNOSIS — E66.01 OBESITY, MORBID (MORE THAN 100 LBS OVER IDEAL WEIGHT OR BMI > 40) (H): Chronic | ICD-10-CM

## 2019-08-07 DIAGNOSIS — Z11.4 SCREENING FOR HIV (HUMAN IMMUNODEFICIENCY VIRUS): ICD-10-CM

## 2019-08-07 DIAGNOSIS — Z78.0 POST-MENOPAUSAL: ICD-10-CM

## 2019-08-07 PROCEDURE — G0402 INITIAL PREVENTIVE EXAM: HCPCS | Performed by: NURSE PRACTITIONER

## 2019-08-07 PROCEDURE — 87624 HPV HI-RISK TYP POOLED RSLT: CPT | Performed by: NURSE PRACTITIONER

## 2019-08-07 PROCEDURE — G0476 HPV COMBO ASSAY CA SCREEN: HCPCS | Performed by: NURSE PRACTITIONER

## 2019-08-07 PROCEDURE — 99213 OFFICE O/P EST LOW 20 MIN: CPT | Mod: 25 | Performed by: NURSE PRACTITIONER

## 2019-08-07 PROCEDURE — G0145 SCR C/V CYTO,THINLAYER,RESCR: HCPCS | Performed by: NURSE PRACTITIONER

## 2019-08-07 ASSESSMENT — ENCOUNTER SYMPTOMS: COUGH: 1

## 2019-08-07 ASSESSMENT — ACTIVITIES OF DAILY LIVING (ADL): CURRENT_FUNCTION: NO ASSISTANCE NEEDED

## 2019-08-07 ASSESSMENT — PAIN SCALES - GENERAL: PAINLEVEL: NO PAIN (0)

## 2019-08-07 ASSESSMENT — MIFFLIN-ST. JEOR: SCORE: 1810.4

## 2019-08-07 NOTE — PROGRESS NOTES
"SUBJECTIVE:   Albina López is a 64 year old female who presents for Preventive Visit.      Are you in the first 12 months of your Medicare coverage?  Yes,  Visual Acuity:  Right Eye: 20/32    Left Eye: 20/50  Both Eyes: 20/32    Healthy Habits:     In general, how would you rate your overall health?  Good    Frequency of exercise:  6-7 days/week    Duration of exercise:  Less than 15 minutes    Do you usually eat at least 4 servings of fruit and vegetables a day, include whole grains    & fiber and avoid regularly eating high fat or \"junk\" foods?  No    Taking medications regularly:  Yes    Medication side effects:  None    Ability to successfully perform activities of daily living:  No assistance needed    Home Safety:  No safety concerns identified    Hearing Impairment:  No hearing concerns    In the past 6 months, have you been bothered by leaking of urine? Yes    In general, how would you rate your overall mental or emotional health?  Good      PHQ-2 Total Score: 0    Additional concerns today:  No    Do you feel safe in your environment? Yes    Do you have a Health Care Directive? No: Advance care planning reviewed with patient; information given to patient to review.      Fall risk  Fallen 2 or more times in the past year?: No  Any fall with injury in the past year?: No  click delete button to remove this line now  Cognitive Screening   1) Repeat 3 items (Leader, Season, Table)    2) Clock draw: NORMAL  3) 3 item recall: Recalls 2 objects   Results: NORMAL clock, 1-2 items recalled: COGNITIVE IMPAIRMENT LESS LIKELY    Mini-CogTM Copyright CHAD Kumar. Licensed by the author for use in [facility]; reprinted with permission (walker@.[email or web address]). All rights reserved.      Do you have sleep apnea, excessive snoring or daytime drowsiness?: yes     Pelvic pain   Right side, over the last month   ?right ovary   With walking mostly - knows it's there. Dull pain. No sharp pains  No fever  No lumps " noted    Reviewed and updated as needed this visit by clinical staff  Tobacco  Allergies  Meds  Problems  Med Hx  Surg Hx  Fam Hx  Soc Hx          Reviewed and updated as needed this visit by Provider  Tobacco  Allergies  Meds  Problems  Med Hx  Surg Hx  Fam Hx  Soc Hx         Social History     Tobacco Use     Smoking status: Former Smoker     Packs/day: 1.00     Years: 1.00     Pack years: 1.00     Smokeless tobacco: Never Used   Substance Use Topics     Alcohol use: No     If you drink alcohol do you typically have >3 drinks per day or >7 drinks per week? No    Alcohol Use 8/7/2019   Prescreen: >3 drinks/day or >7 drinks/week? Not Applicable   Prescreen: >3 drinks/day or >7 drinks/week? -       Current providers sharing in care for this patient include:   Patient Care Team:  No Ref-Primary, Physician as PCP - Holly Leija CNP as Assigned PCP    The following health maintenance items are reviewed in Epic and correct as of today:  Health Maintenance   Topic Date Due     DEXA  1955     HEPATITIS C SCREENING  1955     FIT  05/30/1964     HIV SCREENING  05/30/1969     ZOSTER IMMUNIZATION (1 of 2) 05/30/2004     MAMMO SCREENING  07/08/2012     ADVANCE CARE PLANNING  04/16/2017     HPV  07/24/2017     LIPID  07/24/2017     PHQ-2  01/01/2019     MEDICARE ANNUAL WELLNESS VISIT  05/30/2019     FALL RISK ASSESSMENT  05/30/2019     INFLUENZA VACCINE (1) 09/01/2019     PAP  09/29/2020     DTAP/TDAP/TD IMMUNIZATION (2 - Td) 04/16/2022     IPV IMMUNIZATION  Aged Out     MENINGITIS IMMUNIZATION  Aged Out     Labs reviewed in EPIC  BP Readings from Last 3 Encounters:   08/07/19 136/70   07/03/19 145/88   05/23/19 148/84    Wt Readings from Last 3 Encounters:   08/07/19 134 kg (295 lb 6.4 oz)   07/03/19 131.5 kg (290 lb)   05/23/19 132.5 kg (292 lb)                  Patient Active Problem List   Diagnosis     Advanced directives, counseling/discussion     CARDIOVASCULAR SCREENING; LDL  GOAL LESS THAN 130     Calcium nephrolithiasis     Obesity, morbid (more than 100 lbs over ideal weight or BMI > 40) (H)     Microscopic hematuria     Health Care Home     Past Surgical History:   Procedure Laterality Date     APPENDECTOMY       CHOLECYSTECTOMY       CYSTOSCOPY, RETROGRADES, INSERT STENT URETER(S), COMBINED  4/19/2012    Procedure:COMBINED CYSTOSCOPY, RETROGRADES, INSERT STENT URETER(S); Cystoscopy with Right ureteral stent placement; Surgeon:MARII ESCOBEDO; Location[facility]     DILATION AND CURETTAGE, OPERATIVE HYSTEROSCOPY, COMBINED N/A 11/27/2015    Procedure: COMBINED DILATION AND CURETTAGE, OPERATIVE HYSTEROSCOPY;  Surgeon: Rufina Santizo MD;  Location: [facility] OR     ENT SURGERY       LASER HOLMIUM LITHOTRIPSY URETER(S), INSERT STENT, COMBINED  5/4/2012    Procedure:COMBINED CYSTOSCOPY, URETEROSCOPY, LASER HOLMIUM LITHOTRIPSY URETER(S), INSERT STENT; Right ureteral stone extraction,holmium laser, stent placement; Surgeon:JESSA AREVALO; Location:[facility] OR     TONSILLECTOMY         Social History     Tobacco Use     Smoking status: Former Smoker     Packs/day: 1.00     Years: 1.00     Pack years: 1.00     Smokeless tobacco: Never Used   Substance Use Topics     Alcohol use: No     Family History   Problem Relation Age of Onset     Heart Disease Father         chf     Obesity Sister      Hypertension Daughter      Diabetes Maternal Grandmother      Cancer Maternal Grandmother         kidney     Diabetes Maternal Grandfather      C.A.D. Maternal Grandfather          No current outpatient medications on file.     Allergies   Allergen Reactions     Ciprofloxacin      Dilaudid [Hydromorphone] Other (See Comments)     Decrease in BP     Fluconazole Swelling     Lips swelled and throat closed     Macrobid [Nitrofurantoin]      Hand and wrist pain     Morphine Sulfate Other (See Comments)     Chest heaviness     Nystatin Rash     And burning     Mammogram Screening: Mammogram Screening: Patient  "over age 50, mutual decision to screen reflected in health maintenance.  Last 3 Pap and HPV Results:   PAP / HPV 9/29/2015 7/24/2012   PAP NIL NIL       Review of Systems   HENT: Positive for congestion.    Respiratory: Positive for cough.    Genitourinary: Positive for pelvic pain.     Constitutional, HEENT, cardiovascular, pulmonary, GI, , musculoskeletal, neuro, skin, endocrine and psych systems are negative, except as otherwise noted.    OBJECTIVE:   /70   Pulse 81   Temp 98  F (36.7  C)   Resp 18   Ht 1.53 m (5' 0.25\")   Wt 134 kg (295 lb 6.4 oz)   LMP 09/29/2004   SpO2 94%   Breastfeeding? No   BMI 57.21 kg/m   Estimated body mass index is 57.21 kg/m  as calculated from the following:    Height as of this encounter: 1.53 m (5' 0.25\").    Weight as of this encounter: 134 kg (295 lb 6.4 oz).  Physical Exam  GENERAL APPEARANCE: healthy, alert and no distress  EYES: Eyes grossly normal to inspection, PERRL and conjunctivae and sclerae normal  HENT: ear canals and TM's normal, nose and mouth without ulcers or lesions, oropharynx clear and oral mucous membranes moist  NECK: no adenopathy, no asymmetry, masses, or scars and thyroid normal to palpation  RESP: lungs clear to auscultation - no rales, rhonchi or wheezes  BREAST: normal without masses, tenderness or nipple discharge and no palpable axillary masses or adenopathy  CV: regular rate and rhythm, normal S1 S2, no S3 or S4, no murmur, click or rub, no peripheral edema and peripheral pulses strong  ABDOMEN: soft, mild right lower quadrant tenderness without guarding or rebound, no hepatosplenomegaly, no masses, bowel sounds normal and obese   (female): normal female external genitalia, normal urethral meatus, vaginal mucosal atrophy noted, normal cervix, adnexae, and uterus without masses or abnormal discharge  MS: no musculoskeletal defects are noted and gait is age appropriate without ataxia  SKIN: no suspicious lesions or rashes  NEURO: " Normal strength and tone, sensory exam grossly normal, mentation intact and speech normal  PSYCH: mentation appears normal and affect normal/bright    Diagnostic Test Results:  Labs reviewed in Epic  Pending     ASSESSMENT / PLAN:   1. Encounter for Medicare annual wellness exam      2. Obesity, morbid (more than 100 lbs over ideal weight or BMI > 40) (H)  Patient newly got a treadmill, has always struggled with weight. Agreeable to meet with Nutritionist. Advised to continue to work on increasing exercise and maintaining a healthy diet.   - NUTRITION REFERRAL    3. Pelvic pain in female  1 month history of right sided pelvic pain, dull. No nausea/vomiting. No vaginal bleeding. Is intermittent. Will get pelvic ultrasound to evaluate further.   - US Pelvic Complete w Transvaginal; Future    4. Pap smear for cervical cancer screening    - Pap imaged thin layer screen with HPV - recommended age 30 - 65 years (select HPV order below)  - HPV High Risk Types DNA Cervical    5. Screening for hyperlipidemia  Screen.   - Lipid panel reflex to direct LDL Fasting; Future    6. Breast cancer screening    - MA SCREENING DIGITAL BILAT - Future  (s+30); Future    7. Post-menopausal    - DEXA HIP/PELVIS/SPINE - Future; Future    8. Special screening for malignant neoplasms, colon    - GASTROENTEROLOGY ADULT REF PROCEDURE ONLY [facility] [phone]; [place] [facility] Surgeon    9. Screening for HIV (human immunodeficiency virus)    - HIV Screening; Future    10. Need for hepatitis C screening test    - Hepatitis C Screen Reflex to HCV RNA Quant and Genotype; Future    End of Life Planning:  Patient currently has an advanced directive: No.  I have verified the patient's ablity to prepare an advanced directive/make health care decisions.  Literature was provided to assist patient in preparing an advanced directive.    COUNSELING:  Reviewed preventive health counseling, as reflected in patient instructions    Estimated body mass  "index is 57.21 kg/m  as calculated from the following:    Height as of this encounter: 1.53 m (5' 0.25\").    Weight as of this encounter: 134 kg (295 lb 6.4 oz).    Weight management plan: Discussed healthy diet and exercise guidelines Nutritionist referral     reports that she has quit smoking. She has a 1.00 pack-year smoking history. She has never used smokeless tobacco.      Appropriate preventive services were discussed with this patient, including applicable screening as appropriate for cardiovascular disease, diabetes, osteopenia/osteoporosis, and glaucoma.  As appropriate for age/gender, discussed screening for colorectal cancer, prostate cancer, breast cancer, and cervical cancer. Checklist reviewing preventive services available has been given to the patient.    Reviewed patients plan of care and provided an AVS. The Basic Care Plan (routine screening as documented in Health Maintenance) for Albina meets the Care Plan requirement. This Care Plan has been established and reviewed with the Patient.    Counseling Resources:  ATP IV Guidelines  Pooled Cohorts Equation Calculator  Breast Cancer Risk Calculator  FRAX Risk Assessment  ICSI Preventive Guidelines  Dietary Guidelines for Americans, 2010  USDA's MyPlate  ASA Prophylaxis  Lung CA Screening    FABRICE Clayton CNP  [facility]    Identified Health Risks:  "

## 2019-08-07 NOTE — NURSING NOTE
Chief Complaint   Patient presents with     Physical       Initial LMP 09/29/2004   Breastfeeding? No  Estimated body mass index is 56.64 kg/m  as calculated from the following:    Height as of 5/23/19: 1.524 m (5').    Weight as of 7/3/19: 131.5 kg (290 lb).    Patient presents to the clinic using No DME    Health Maintenance that is potentially due pending provider review:  Health Maintenance Due   Topic Date Due     DEXA  1955     HEPATITIS C SCREENING  1955     FIT  05/30/1964     HIV SCREENING  05/30/1969     ZOSTER IMMUNIZATION (1 of 2) 05/30/2004     MAMMO SCREENING  07/08/2012     ADVANCE CARE PLANNING  04/16/2017     HPV  07/24/2017     LIPID  07/24/2017     PHQ-2  01/01/2019     MEDICARE ANNUAL WELLNESS VISIT  05/30/2019     FALL RISK ASSESSMENT  05/30/2019       Possibly completing today per provider review.    Is there anyone who you would like to be able to receive your results? not asked  If yes have patient fill out GRACE

## 2019-08-07 NOTE — LETTER
August 14, 2019    Albina López  [address] [facility] [address]    Dear ,  This letter is regarding your recent Pap smear (cervical cancer screening) and Human Papillomavirus (HPV) test.  We are happy to inform you that your Pap smear result is normal. Cervical cancer is closely linked with certain types of HPV. Your results showed no evidence of high-risk HPV.  We recommend you have your next PAP smear and HPV test in 5 years.  You will still need to return to the clinic every year for an annual exam and other preventive tests.  If you have additional questions regarding this result, please call our registered nurse, Stacia at [phone].  Sincerely,    Flores Greene, APRN CNP/rlm

## 2019-08-07 NOTE — PATIENT INSTRUCTIONS
Schedule lab only appointment within the next week when you are fasting for lab work - will call you with results     Schedule a pelvic ultrasound to evaluate pelvic pain - if negative, may do abdominal ultrasound to evaluate hernia - will call you with results     Schedule dentist appointment and eye exam    Colonoscopy will call you to schedule     Schedule mammogram at numbers below     You will also need a bone scan to evaluate for bone loss due to age, this is recommended after age 65     Schedule appointment with Nutritionist and continue/start using your treadmill    Return to clinic 1 year for routine physical or sooner if needed        [facility] Mammo Schedule  [facility] ~ [phone]  One Day Weekly- Alternating Days    [place] ~ [phone]  Every other Monday or Wednesday   & one Saturday morning a month    [place] ~ [phone]  Every Other Monday Afternoon    [place] ~ [phone]  Every Other Monday Morning    [place] ~ [phone]  Every Monday morning  Every Tuesday afternoon  Wed, Thurs, Friday morning & afternoon         Patient Education   Personalized Prevention Plan  You are due for the preventive services outlined below.  Your care team is available to assist you in scheduling these services.  If you have already completed any of these items, please share that information with your care team to update in your medical record.  Health Maintenance Due   Topic Date Due     Osteoporosis Screening  1955     Hepatitis C Screening  1955     FIT Test  05/30/1964     HIV Screening  05/30/1969     Zoster (Shingles) Vaccine (1 of 2) 05/30/2004     Mammogram  07/08/2012     Discuss Advance Care Planning  04/16/2017     HPV Screening  07/24/2017     Cholesterol Lab  07/24/2017     PHQ-2  01/01/2019     Annual Wellness Visit  05/30/2019     FALL RISK ASSESSMENT  05/30/2019       Understanding USDA MyPlate  The USDA (U.S. Department of Agriculture) has guidelines to help you make  healthy food choices. These are called MyPlate. MyPlate shows the food groups that make up healthy meals using the image of a place setting. Before you eat, think about the healthiest choices for what to put onto your plate or into your cup or bowl. To learn more about building a healthy plate, visit www.choosemyplate.gov.    The food groups    Fruits. Any fruit or 100% fruit juice counts as part of the Fruit Group. Fruits may be fresh, canned, frozen, or dried, and may be whole, cut-up, or pureed. Make half your plate fruits and vegetables.    Vegetables. Any vegetable or 100% vegetable juice counts as a member of the Vegetable Group. Vegetables may be fresh, frozen, canned, or dried. They can be served raw or cooked and may be whole, cut-up, or mashed. Make half your plate fruits and vegetables.    Grains. All foods made from grains are part of the Grains Group. These include wheat, rice, oats, cornmeal, and barley such as bread, pasta, oatmeal, cereal, tortillas, and grits. Grains should be no more than a quarter of your plate. At least half of your grains should be whole grains.    Protein. This group includes meat, poultry, seafood, beans and peas, eggs, processed soy products (like tofu), nuts (including nut butters), and seeds. Make protein choices no more than a quarter of your plate. Meat and poultry choices should be lean or low fat.    Dairy. All fluid milk products and foods made from milk that contain calcium, like yogurt and cheese, are part of the Dairy Group. (Foods that have little calcium, such as cream, butter, and cream cheese, are not part of the group.) Most dairy choices should be low-fat or fat-free.    Oils. These are fats that are liquid at room temperature. They include canola, corn, olive, soybean, and sunflower oil. Foods that are mainly oil include mayonnaise, certain salad dressings, and soft margarines. You should have only 5 to 7 teaspoons of oils a day. You probably already get this  much from the food you eat.  Date Last Reviewed: 8/1/2017 2000-2018 [employer]. [address] [place] [address], [place] [address]. All rights reserved. This information is not intended as a substitute for professional medical care. Always follow your healthcare professional's instructions.

## 2019-08-08 DIAGNOSIS — Z13.220 SCREENING FOR HYPERLIPIDEMIA: ICD-10-CM

## 2019-08-08 DIAGNOSIS — Z11.59 NEED FOR HEPATITIS C SCREENING TEST: ICD-10-CM

## 2019-08-08 DIAGNOSIS — Z11.4 SCREENING FOR HIV (HUMAN IMMUNODEFICIENCY VIRUS): ICD-10-CM

## 2019-08-08 LAB
CHOLEST SERPL-MCNC: 173 MG/DL
HCV AB SERPL QL IA: NONREACTIVE
HDLC SERPL-MCNC: 60 MG/DL
HIV 1+2 AB+HIV1 P24 AG SERPL QL IA: NONREACTIVE
LDLC SERPL CALC-MCNC: 83 MG/DL
NONHDLC SERPL-MCNC: 113 MG/DL
TRIGL SERPL-MCNC: 148 MG/DL

## 2019-08-08 PROCEDURE — 86803 HEPATITIS C AB TEST: CPT | Performed by: NURSE PRACTITIONER

## 2019-08-08 PROCEDURE — 80061 LIPID PANEL: CPT | Performed by: NURSE PRACTITIONER

## 2019-08-08 PROCEDURE — 36415 COLL VENOUS BLD VENIPUNCTURE: CPT | Performed by: NURSE PRACTITIONER

## 2019-08-08 PROCEDURE — 87389 HIV-1 AG W/HIV-1&-2 AB AG IA: CPT | Performed by: NURSE PRACTITIONER

## 2019-08-09 LAB
COPATH REPORT: NORMAL
PAP: NORMAL

## 2019-08-12 LAB
FINAL DIAGNOSIS: NORMAL
HPV HR 12 DNA CVX QL NAA+PROBE: NEGATIVE
HPV16 DNA SPEC QL NAA+PROBE: NEGATIVE
HPV18 DNA SPEC QL NAA+PROBE: NEGATIVE
SPECIMEN DESCRIPTION: NORMAL
SPECIMEN SOURCE CVX/VAG CYTO: NORMAL

## 2019-08-27 ENCOUNTER — HOSPITAL ENCOUNTER (OUTPATIENT)
Dept: ULTRASOUND IMAGING | Facility: CLINIC | Age: 64
End: 2019-08-27
Attending: NURSE PRACTITIONER
Payer: MEDICARE

## 2019-08-27 ENCOUNTER — HOSPITAL ENCOUNTER (OUTPATIENT)
Dept: MAMMOGRAPHY | Facility: CLINIC | Age: 64
End: 2019-08-27
Attending: NURSE PRACTITIONER
Payer: MEDICARE

## 2019-08-27 ENCOUNTER — HOSPITAL ENCOUNTER (OUTPATIENT)
Dept: BONE DENSITY | Facility: CLINIC | Age: 64
Discharge: HOME | End: 2019-08-27
Attending: NURSE PRACTITIONER | Admitting: NURSE PRACTITIONER
Payer: MEDICARE

## 2019-08-27 DIAGNOSIS — Z78.0 POST-MENOPAUSAL: ICD-10-CM

## 2019-08-27 DIAGNOSIS — R10.2 PELVIC PAIN IN FEMALE: ICD-10-CM

## 2019-08-27 DIAGNOSIS — Z12.39 BREAST CANCER SCREENING: ICD-10-CM

## 2019-08-27 PROCEDURE — 77067 SCR MAMMO BI INCL CAD: CPT

## 2019-08-27 PROCEDURE — 77080 DXA BONE DENSITY AXIAL: CPT

## 2019-08-27 PROCEDURE — 76830 TRANSVAGINAL US NON-OB: CPT

## 2019-08-28 PROBLEM — M85.851 OSTEOPENIA OF NECKS OF BOTH FEMURS: Status: ACTIVE | Noted: 2019-08-28

## 2019-08-28 PROBLEM — M85.852 OSTEOPENIA OF NECKS OF BOTH FEMURS: Status: ACTIVE | Noted: 2019-08-28

## 2021-05-20 NOTE — ED NOTES
Ambulated patient around the department with the sat monitor per .  Patient tolerated well with O2 sats at 93% on room air and heart rate of 98.    
Yes
